# Patient Record
Sex: FEMALE | URBAN - METROPOLITAN AREA
[De-identification: names, ages, dates, MRNs, and addresses within clinical notes are randomized per-mention and may not be internally consistent; named-entity substitution may affect disease eponyms.]

---

## 2018-09-10 ENCOUNTER — IMPORTED ENCOUNTER (OUTPATIENT)
Dept: URBAN - METROPOLITAN AREA CLINIC 1 | Facility: CLINIC | Age: 58
End: 2018-09-10

## 2018-09-10 PROBLEM — H25.13: Noted: 2018-09-10

## 2018-09-10 PROBLEM — H04.123: Noted: 2018-09-10

## 2018-09-10 PROCEDURE — 92004 COMPRE OPH EXAM NEW PT 1/>: CPT

## 2018-09-10 NOTE — PATIENT DISCUSSION
1.  Dry Eyes OU -- Recommended to patient to use Artificial Tears BID OU (sample given). 2. Cataracts OU -- Observe for now without intervention. The patient was advised to contact us if any change or worsening of vision. Patient defers the refraction at today's visit. Return for an appointment in 1 YR for a 30 OU with Dr. Claudia Garza.

## 2019-08-21 NOTE — PROCEDURE NOTE: CLINICAL
PROCEDURE NOTE: Epilation Right Lower Lid. Diagnosis: Trichiasis without Entropion. Anesthesia: Topical. Prior to treatment, risks/benefits/alternatives discussed including infection, loss of vision, hemorrhage, cataract, glaucoma, retinal tears or detachment. Aberrant lashes removed from * lid(s) using microforcep. Patient tolerated procedure well. There were no complications. Post-op instructions given. Sheron Vyas

## 2019-12-03 NOTE — PROCEDURE NOTE: CLINICAL
PROCEDURE NOTE: Epilation Right Lower Lid. Prior to treatment, risks/benefits/alternatives discussed including infection, loss of vision, hemorrhage, cataract, glaucoma, retinal tears or detachment. Aberrant lashes removed from * lid(s) using microforcep. Patient tolerated procedure well. There were no complications. Post-op instructions given. Queen Wojciech

## 2019-12-03 NOTE — PATIENT DISCUSSION
CONTINUE CARE WITH DR. DUFFY WITH YEARLY OCT, HVF AND ON EVAL. IF CHANGES, SEND BACK TO [address] [facility].

## 2020-02-17 NOTE — PATIENT DISCUSSION
CONTINUE CARE WITH DR. DUFFY WITH YEARLY OCT, HVF AND ON EVAL. IF CHANGES, SEND BACK TO [address] [facility].

## 2020-02-26 NOTE — PATIENT DISCUSSION
CONTINUE CARE WITH DR. DUFFY WITH YEARLY OCT, HVF AND ON EVAL. IF CHANGES, SEND BACK TO [address] [facility].

## 2020-03-27 NOTE — PATIENT DISCUSSION
CONTINUE CARE WITH DR. DUFFY WITH YEARLY OCT, HVF AND ON EVAL. IF CHANGES, SEND BACK TO [address] [facility].

## 2020-05-05 NOTE — PATIENT DISCUSSION
CONTINUE CARE WITH DR. DUFFY WITH YEARLY OCT, HVF AND ON EVAL. IF CHANGES, SEND BACK TO [address] [facility].

## 2020-06-19 NOTE — PATIENT DISCUSSION
CONTINUE CARE WITH DR. DUFFY WITH YEARLY OCT, HVF AND ON EVAL. IF CHANGES, SEND BACK TO [address] [facility]

## 2020-06-25 NOTE — PATIENT DISCUSSION
CONTINUE CARE WITH DR. DUFFY WITH YEARLY OCT, HVF AND ON EVAL. IF CHANGES, SEND BACK TO [address] [facility].

## 2020-06-30 NOTE — PATIENT DISCUSSION
WLS stats constider starting the Latanoprost once again in the future.  If skin area is not improving ANTONIA consider DERM for consult.

## 2020-06-30 NOTE — PATIENT DISCUSSION
CONTINUE CARE WITH DR. DUFFY WITH YEARLY OCT, HVF AND ON EVAL. IF CHANGES, SEND BACK TO [address] [facility].

## 2020-07-20 NOTE — PATIENT DISCUSSION
ANTONIA to stop all glaucoma drops stat Cosopt for one month will consider restarting in once month.

## 2020-07-20 NOTE — PATIENT DISCUSSION
CONTINUE CARE WITH DR. DUFFY WITH YEARLY OCT, HVF AND ON EVAL. IF CHANGES, SEND BACK TO [address] [facility].

## 2020-08-19 NOTE — PATIENT DISCUSSION
CONTINUE CARE WITH DR. DUFFY WITH YEARLY OCT, HVF AND ON EVAL. IF CHANGES, SEND BACK TO [address] [facility].

## 2020-08-21 NOTE — PATIENT DISCUSSION
CONTINUE CARE WITH DR. DUFFY WITH YEARLY OCT, HVF AND ON EVAL. IF CHANGES, SEND BACK TO [address] [facility].

## 2020-08-26 NOTE — PATIENT DISCUSSION
DISCUSSED OPTION OF CONTINUING ZIOPTAN VS [facility] R&B UNDERSTANDS THAT [facility] MAY NOT GET PT OFF ZIOPTAN. PT CHOOSES TO CONTINUE ZIOPTAN PT TO FOLLOW UP WITH DR FOFANA TO SEE FULL EFFECT OF THE DROPS IF PT DEVELOPS AN INTOLERANCE OR IOP NOT AT TARGET RANGE SEND BACK FOR [facility].

## 2022-04-02 ASSESSMENT — TONOMETRY
OD_IOP_MMHG: 18
OS_IOP_MMHG: 18

## 2022-04-02 ASSESSMENT — VISUAL ACUITY
OS_CC: J1
OD_CC: 20/25
OS_CC: 20/25
OD_CC: J1
